# Patient Record
Sex: MALE | Race: WHITE | NOT HISPANIC OR LATINO | URBAN - METROPOLITAN AREA
[De-identification: names, ages, dates, MRNs, and addresses within clinical notes are randomized per-mention and may not be internally consistent; named-entity substitution may affect disease eponyms.]

---

## 2020-06-03 ENCOUNTER — OFFICE VISIT (OUTPATIENT)
Dept: URBAN - METROPOLITAN AREA CLINIC 13 | Facility: CLINIC | Age: 35
End: 2020-06-03

## 2020-06-04 ENCOUNTER — OFFICE VISIT (OUTPATIENT)
Dept: URBAN - METROPOLITAN AREA CLINIC 113 | Facility: CLINIC | Age: 35
End: 2020-06-04

## 2020-07-25 ENCOUNTER — TELEPHONE ENCOUNTER (OUTPATIENT)
Dept: URBAN - METROPOLITAN AREA CLINIC 13 | Facility: CLINIC | Age: 35
End: 2020-07-25

## 2020-07-25 RX ORDER — CIPROFLOXACIN HYDROCHLORIDE 500 MG/1
TAKE 1 TABLET EVERY 12 HOURS DAILY TABLET, FILM COATED ORAL
Qty: 28 | Refills: 0 | OUTPATIENT
Start: 2015-05-15 | End: 2016-03-07

## 2020-07-25 RX ORDER — INFLIXIMAB 100 MG/10ML
USE AS DIRECTED INJECTION, POWDER, LYOPHILIZED, FOR SOLUTION INTRAVENOUS
Refills: 0 | OUTPATIENT
End: 2020-06-03

## 2020-07-25 RX ORDER — METRONIDAZOLE 500 MG/1
TAKE 1 TABLET TWICE DAILY TABLET ORAL
Qty: 28 | Refills: 0 | OUTPATIENT
Start: 2015-05-15 | End: 2016-03-07

## 2020-07-25 RX ORDER — METRONIDAZOLE 500 MG/1
TAKE 1 TABLET 3 TIMES DAILY TABLET ORAL
Qty: 90 | Refills: 0 | OUTPATIENT
Start: 2016-11-11 | End: 2016-11-29

## 2020-07-25 RX ORDER — PANTOPRAZOLE SODIUM 40 MG/1
TAKE 1 TABLET DAILY TABLET, DELAYED RELEASE ORAL
Qty: 30 | Refills: 6 | OUTPATIENT
Start: 2016-03-07 | End: 2017-11-22

## 2020-07-25 RX ORDER — OXYCODONE AND ACETAMINOPHEN 5; 325 MG/1; MG/1
TAKE 1 TABLET EVERY 4 HOURS AS NEEDED FOR PAIN TABLET ORAL
Qty: 10 | Refills: 0 | OUTPATIENT
Start: 2016-03-07 | End: 2017-11-22

## 2020-07-25 RX ORDER — ALPRAZOLAM 0.5 MG
TAKE 1 TABLET DAILY TABLET ORAL
Refills: 0 | OUTPATIENT
End: 2015-05-11

## 2020-07-25 RX ORDER — MERCAPTOPURINE 50 MG/1
TAKE 1 TABLET DAILY TABLET ORAL
Qty: 30 | Refills: 2 | OUTPATIENT
Start: 2015-05-11 | End: 2016-03-07

## 2020-07-25 RX ORDER — DIPHENOXYLATE HYDROCHLORIDE AND ATROPINE SULFATE 2.5; .025 MG/1; MG/1
TAKE 1 CAPLET EVERY 4-6 HOURS DAILY AS NEEDED TABLET ORAL
Qty: 60 | Refills: 3 | OUTPATIENT
Start: 2016-03-07 | End: 2017-11-22

## 2020-07-25 RX ORDER — PREDNISONE 20 MG/1
TAKE AS DIRECTED TABLET ORAL
Refills: 0 | OUTPATIENT
End: 2015-05-11

## 2020-07-25 RX ORDER — CIPROFLOXACIN HYDROCHLORIDE 500 MG/1
TAKE 1 TABLET EVERY 12 HOURS DAILY TABLET, FILM COATED ORAL
Qty: 60 | Refills: 0 | OUTPATIENT
Start: 2016-11-11 | End: 2016-11-29

## 2020-07-25 RX ORDER — TRAMADOL HYDROCHLORIDE 100 MG/1
TAKE 1 TABLET DAILY TABLET, EXTENDED RELEASE ORAL
Qty: 25 | Refills: 0 | OUTPATIENT
End: 2017-11-22

## 2020-07-26 ENCOUNTER — TELEPHONE ENCOUNTER (OUTPATIENT)
Dept: URBAN - METROPOLITAN AREA CLINIC 13 | Facility: CLINIC | Age: 35
End: 2020-07-26

## 2020-07-26 RX ORDER — TRAMADOL HYDROCHLORIDE 50 MG/1
TABLET ORAL
Qty: 60 | Refills: 0 | Status: ACTIVE | COMMUNITY
Start: 2014-10-28

## 2020-07-26 RX ORDER — FAMCICLOVIR 500 MG/1
TABLET, FILM COATED ORAL
Qty: 7 | Refills: 0 | Status: ACTIVE | COMMUNITY
Start: 2014-07-02

## 2020-07-26 RX ORDER — PROMETHAZINE HYDROCHLORIDE 25 MG/1
TABLET ORAL
Qty: 30 | Refills: 0 | Status: ACTIVE | COMMUNITY
Start: 2014-08-04

## 2020-07-26 RX ORDER — TRAMADOL HYDROCHLORIDE 50 MG/1
TAKE 1 TABLET EVERY 6 HOURS AS NEEDED FOR BREAKTHROUGH PAIN TABLET ORAL
Qty: 30 | Refills: 0 | Status: ACTIVE | COMMUNITY
Start: 2020-06-04

## 2020-07-26 RX ORDER — PROMETHAZINE HYDROCHLORIDE 25 MG/1
SUPPOSITORY RECTAL
Qty: 10 | Refills: 0 | Status: ACTIVE | COMMUNITY
Start: 2014-08-01

## 2020-07-26 RX ORDER — PREDNISONE 10 MG/1
TAKE 3 TABLET DAILY TABLET ORAL
Qty: 90 | Refills: 1 | Status: ACTIVE | COMMUNITY
Start: 2020-06-04

## 2020-07-26 RX ORDER — ALPRAZOLAM 0.25 MG/1
TAKE 1 TABLET BY MOUTH TWICE A DAY AS NEEDED FOR ANXIETY TABLET ORAL
Qty: 30 | Refills: 0 | Status: ACTIVE | COMMUNITY
Start: 2019-09-20

## 2021-04-20 ENCOUNTER — TELEPHONE ENCOUNTER (OUTPATIENT)
Dept: URBAN - METROPOLITAN AREA SURGERY CENTER 30 | Facility: SURGERY CENTER | Age: 36
End: 2021-04-20

## 2021-05-21 ENCOUNTER — ERX REFILL RESPONSE (OUTPATIENT)
Dept: URBAN - METROPOLITAN AREA CLINIC 113 | Facility: CLINIC | Age: 36
End: 2021-05-21

## 2021-05-21 RX ORDER — PREDNISONE 10 MG/1
TAKE 3 TABLETS BY MOUTH EVERY DAY TABLET ORAL
Qty: 90 | Refills: 1

## 2021-06-10 ENCOUNTER — TELEPHONE ENCOUNTER (OUTPATIENT)
Dept: URBAN - METROPOLITAN AREA CLINIC 107 | Facility: CLINIC | Age: 36
End: 2021-06-10

## 2021-06-10 ENCOUNTER — OFFICE VISIT (OUTPATIENT)
Dept: URBAN - METROPOLITAN AREA TELEHEALTH 2 | Facility: TELEHEALTH | Age: 36
End: 2021-06-10
Payer: COMMERCIAL

## 2021-06-10 VITALS — WEIGHT: 181 LBS | BODY MASS INDEX: 26.81 KG/M2 | RESPIRATION RATE: 18 BRPM | HEIGHT: 69 IN

## 2021-06-10 DIAGNOSIS — K50.80 CROHN'S COLITIS: ICD-10-CM

## 2021-06-10 PROCEDURE — 99214 OFFICE O/P EST MOD 30 MIN: CPT | Performed by: INTERNAL MEDICINE

## 2021-06-10 RX ORDER — INFLIXIMAB 100 MG/10ML
AS DIRECTED INJECTION, POWDER, LYOPHILIZED, FOR SOLUTION INTRAVENOUS
OUTPATIENT
Start: 2021-06-10

## 2021-06-10 RX ORDER — PREDNISONE 10 MG/1
TAKE 3 TABLETS BY MOUTH EVERY DAY TABLET ORAL
Qty: 90 | Refills: 1 | Status: ACTIVE | COMMUNITY

## 2021-06-10 RX ORDER — TRAMADOL HYDROCHLORIDE 50 MG/1
TABLET ORAL
Qty: 60 | Refills: 0 | Status: DISCONTINUED | COMMUNITY
Start: 2014-10-28

## 2021-06-10 RX ORDER — FAMCICLOVIR 500 MG/1
TABLET, FILM COATED ORAL
Qty: 7 | Refills: 0 | Status: DISCONTINUED | COMMUNITY
Start: 2014-07-02

## 2021-06-10 RX ORDER — ALPRAZOLAM 0.25 MG/1
TAKE 1 TABLET BY MOUTH TWICE A DAY AS NEEDED FOR ANXIETY TABLET ORAL
Qty: 30 | Refills: 0 | Status: ACTIVE | COMMUNITY
Start: 2019-09-20

## 2021-06-10 RX ORDER — PROMETHAZINE HYDROCHLORIDE 25 MG/1
TABLET ORAL
Qty: 30 | Refills: 0 | Status: DISCONTINUED | COMMUNITY
Start: 2014-08-04

## 2021-06-10 RX ORDER — PROMETHAZINE HYDROCHLORIDE 25 MG/1
SUPPOSITORY RECTAL
Qty: 10 | Refills: 0 | Status: DISCONTINUED | COMMUNITY
Start: 2014-08-01

## 2021-06-10 NOTE — HPI-TODAY'S VISIT:
Mr. Ventura is a 35-year-old male with fistulizing ileocolonic Crohn's disease diagnosed at age 5 s/p 2 small bowel/colonic resections previously on Remicade 5mg/kg q 6 weeks presenting for follow up. He was last seen here on June 2020 via telemedicine visit.  At that time, he had moved to [facility] in October 2019 and had had no Remicade since that time.  He had some obstructive symptoms around the time of that June 2020 telemedicine visit, and self-medicating with prednisone and a liquid diet.  His symptoms improved after this.  He got back on Remicade until November 2020, when he lost his insurance once again, and he has not had any Remicade since then.  Despite this, he has done relatively well, with only one flareup in February 2021, which responded to prednisone.  He is now moved back to [place], is working for [employer] once again, and will need to be placed back on Remicade therapy.  He has previously received infusions through [facility] , like to go back to them for this as they will do his infusions at home.  Prior to his June 2020 telemedicine visit,  he had not been seen since November 22, 2017. He had a previous problem with a recurrent fistula, and had been referred to Dr. Jamey Mcginnis of colorectal surgery. Dr. Mcginnis performed surgery on the patient in January 2017. The patient returned at the time the last visit and stated that he was doing "great." He had continued on Remicade, with no problems with fistulas since his surgery, and was not having significant diarrhea.   At the time I initially saw him, on April 9, 2015, we recommended a surveillance colonoscopy, as his last colonoscopy had been in 2013 by Dr. Aguilar. The patient never had this procedure done. He did have a CT enterography performed on April 24, 2015 which showed a matted aggregation of bowel loops, both large and small bowel, in the right lower quadrant area. There was no abscess or perforation.   He has had no recent labs done.

## 2021-06-17 ENCOUNTER — TELEPHONE ENCOUNTER (OUTPATIENT)
Dept: URBAN - METROPOLITAN AREA CLINIC 113 | Facility: CLINIC | Age: 36
End: 2021-06-17

## 2021-06-29 ENCOUNTER — TELEPHONE ENCOUNTER (OUTPATIENT)
Dept: URBAN - METROPOLITAN AREA CLINIC 113 | Facility: CLINIC | Age: 36
End: 2021-06-29

## 2021-07-09 ENCOUNTER — TELEPHONE ENCOUNTER (OUTPATIENT)
Dept: URBAN - METROPOLITAN AREA CLINIC 113 | Facility: CLINIC | Age: 36
End: 2021-07-09

## 2021-09-22 ENCOUNTER — TELEPHONE ENCOUNTER (OUTPATIENT)
Dept: URBAN - METROPOLITAN AREA CLINIC 113 | Facility: CLINIC | Age: 36
End: 2021-09-22

## 2021-09-22 ENCOUNTER — OFFICE VISIT (OUTPATIENT)
Dept: URBAN - METROPOLITAN AREA CLINIC 107 | Facility: CLINIC | Age: 36
End: 2021-09-22

## 2021-09-22 RX ORDER — INFLIXIMAB 100 MG/10ML
AS DIRECTED INJECTION, POWDER, LYOPHILIZED, FOR SOLUTION INTRAVENOUS
Status: ACTIVE | COMMUNITY
Start: 2021-06-10

## 2021-09-22 RX ORDER — ALPRAZOLAM 0.25 MG/1
TAKE 1 TABLET BY MOUTH TWICE A DAY AS NEEDED FOR ANXIETY TABLET ORAL
Qty: 30 | Refills: 0 | Status: ACTIVE | COMMUNITY
Start: 2019-09-20

## 2021-09-22 RX ORDER — PREDNISONE 10 MG/1
TAKE 3 TABLETS BY MOUTH EVERY DAY TABLET ORAL
Qty: 90 | Refills: 1 | Status: ACTIVE | COMMUNITY

## 2021-09-22 NOTE — HPI-TODAY'S VISIT:
Mr. Ventura is a 36-year-old male with fistulizing ileocolonic Crohn's disease diagnosed at age 5 s/p 2 small bowel/colonic resections previously on Remicade 5mg/kg q 6 weeks presenting for follow up. He was last seen here on Bing 10, 2021.  He was previously seen here on June 2020 via telemedicine visit.  At that time, he had moved to [facility] in October 2019 and had had no Remicade since that time.  He had some obstructive symptoms around the time of that June 2020 telemedicine visit, and self-medicating with prednisone and a liquid diet.  His symptoms improved after this.  He got back on Remicade until November 2020, when he lost his insurance once again, and he has not had any Remicade since then.  Despite this, he has done relatively well, with only one flareup in February 2021, which responded to prednisone.  He has now moved back to [place], is working for [employer] once again, and will need to be placed back on Remicade therapy.  He has previously received infusions through [facility] , like to go back to them for this as they will do his infusions at home.  This is supposed to be arranged at the time of his last visit, and orders were written, but his insurance denied the claim.  Prior to his June 2020 telemedicine visit,  he had not been seen since November 22, 2017. He had a previous problem with a recurrent fistula, and had been referred to Dr. Jamey Mcginnis of colorectal surgery. Dr. Mcginnis performed surgery on the patient in January 2017. The patient returned at the time the last visit and stated that he was doing "great." He had continued on Remicade, with no problems with fistulas since his surgery, and was not having significant diarrhea.   At the time I initially saw him, on April 9, 2015, we recommended a surveillance colonoscopy, as his last colonoscopy had been in 2013 by Dr. Aguilar. The patient never had this procedure done. He did have a CT enterography performed on April 24, 2015 which showed a matted aggregation of bowel loops, both large and small bowel, in the right lower quadrant area. There was no abscess or perforation.   He has had no recent labs done.

## 2022-01-07 ENCOUNTER — OFFICE VISIT (OUTPATIENT)
Dept: URBAN - METROPOLITAN AREA CLINIC 107 | Facility: CLINIC | Age: 37
End: 2022-01-07

## 2022-01-07 RX ORDER — ALPRAZOLAM 0.25 MG/1
TAKE 1 TABLET BY MOUTH TWICE A DAY AS NEEDED FOR ANXIETY TABLET ORAL
Qty: 30 | Refills: 0 | Status: ACTIVE | COMMUNITY
Start: 2019-09-20

## 2022-01-07 RX ORDER — PREDNISONE 10 MG/1
TAKE 3 TABLETS BY MOUTH EVERY DAY TABLET ORAL
Qty: 90 | Refills: 1 | Status: ACTIVE | COMMUNITY

## 2022-01-07 RX ORDER — INFLIXIMAB 100 MG/10ML
AS DIRECTED INJECTION, POWDER, LYOPHILIZED, FOR SOLUTION INTRAVENOUS
Status: ACTIVE | COMMUNITY
Start: 2021-06-10

## 2022-01-07 NOTE — HPI-TODAY'S VISIT:
Mr. Ventura is a 36-year-old male with fistulizing ileocolonic Crohn's disease diagnosed at age 5 s/p 2 small bowel/colonic resections previously on Remicade 5mg/kg q 6 weeks presenting for follow up. He was last seen here on Bing 10, 2021.  He was previously seen here on June 2020 via telemedicine visit.  At that time, he had moved to [facility] in October 2019 and had had no Remicade since that time.  He had some obstructive symptoms around the time of that June 2020 telemedicine visit, and self-medicating with prednisone and a liquid diet.  His symptoms improved after this.  He got back on Remicade until November 2020, when he lost his insurance once again, and he has not had any Remicade since then.  Despite this, he has done relatively well, with only one flareup in February 2021, which responded to prednisone.  He has now moved back to [place], is working for [employer] once again, and will need to be placed back on Remicade therapy.  He has previously received infusions through [facility] , like to go back to them for this as they will do his infusions at home.  This is supposed to be arranged at the time of his last visit, and orders were written, but his insurance denied the claim.  Prior to his June 2020 telemedicine visit,  he had not been seen since November 22, 2017. He had a previous problem with a recurrent fistula, and had been referred to Dr. Jamey Mcginnis of colorectal surgery. Dr. Mcginnis performed surgery on the patient in January 2017. The patient returned at the time the last visit and stated that he was doing "great." He had continued on Remicade, with no problems with fistulas since his surgery, and was not having significant diarrhea.   At the time I initially saw him, on April 9, 2015, we recommended a surveillance colonoscopy, as his last colonoscopy had been in 2013 by Dr. Aguilar. The patient never had this procedure done. He did have a CT enterography performed on April 24, 2015 which showed a matted aggregation of bowel loops, both large and small bowel, in the right lower quadrant area. There was no abscess or perforation.   He has had no recent labs done.

## 2022-06-27 ENCOUNTER — TELEPHONE ENCOUNTER (OUTPATIENT)
Dept: URBAN - METROPOLITAN AREA CLINIC 113 | Facility: CLINIC | Age: 37
End: 2022-06-27

## 2022-06-28 ENCOUNTER — TELEPHONE ENCOUNTER (OUTPATIENT)
Dept: URBAN - METROPOLITAN AREA CLINIC 113 | Facility: CLINIC | Age: 37
End: 2022-06-28

## 2022-08-02 ENCOUNTER — OFFICE VISIT (OUTPATIENT)
Dept: URBAN - METROPOLITAN AREA CLINIC 107 | Facility: CLINIC | Age: 37
End: 2022-08-02
Payer: COMMERCIAL

## 2022-08-02 ENCOUNTER — TELEPHONE ENCOUNTER (OUTPATIENT)
Dept: URBAN - METROPOLITAN AREA CLINIC 113 | Facility: CLINIC | Age: 37
End: 2022-08-02

## 2022-08-02 ENCOUNTER — WEB ENCOUNTER (OUTPATIENT)
Dept: URBAN - METROPOLITAN AREA CLINIC 107 | Facility: CLINIC | Age: 37
End: 2022-08-02

## 2022-08-02 VITALS
DIASTOLIC BLOOD PRESSURE: 94 MMHG | WEIGHT: 190 LBS | HEART RATE: 83 BPM | HEIGHT: 69 IN | RESPIRATION RATE: 18 BRPM | BODY MASS INDEX: 28.14 KG/M2 | SYSTOLIC BLOOD PRESSURE: 128 MMHG | TEMPERATURE: 98.6 F

## 2022-08-02 DIAGNOSIS — K50.80 CROHN'S DISEASE OF BOTH SMALL AND LARGE INTESTINE WITHOUT COMPLICATION: ICD-10-CM

## 2022-08-02 PROBLEM — 426549001: Status: ACTIVE | Noted: 2021-06-10

## 2022-08-02 PROCEDURE — 99213 OFFICE O/P EST LOW 20 MIN: CPT | Performed by: INTERNAL MEDICINE

## 2022-08-02 RX ORDER — SODIUM, POTASSIUM,MAG SULFATES 17.5-3.13G
354 ML SOLUTION, RECONSTITUTED, ORAL ORAL ONCE
Qty: 354 MILLILITER | Refills: 0 | OUTPATIENT
Start: 2022-08-04 | End: 2022-08-05

## 2022-08-02 RX ORDER — INFLIXIMAB 100 MG/10ML
5MG/KG INJECTION, POWDER, LYOPHILIZED, FOR SOLUTION INTRAVENOUS
Status: ACTIVE | COMMUNITY
Start: 2021-06-10

## 2022-08-02 RX ORDER — PREDNISONE 10 MG/1
TAKE 3 TABLETS BY MOUTH EVERY DAY TABLET ORAL
Qty: 90 | Refills: 1 | Status: ON HOLD | COMMUNITY

## 2022-08-02 RX ORDER — ALPRAZOLAM 0.25 MG/1
TAKE 1 TABLET BY MOUTH TWICE A DAY AS NEEDED FOR ANXIETY TABLET ORAL
Qty: 30 | Refills: 0 | Status: ON HOLD | COMMUNITY
Start: 2019-09-20

## 2022-08-02 NOTE — HPI-TODAY'S VISIT:
Is a 36-year-old male with a history of fistulizing ileocolonic Crohn's disease diagnosed at age 5 status post 2 small bowel/colonic resections on Remicade presenting for follow-up. He was last seen 6/10/2021 for a telemedicine visit.  He was in clinical remission but has been off Remicade.  He was to undergo reinduction and labs were ordered. He is compliant with Remicade 5 mg/kg every 6 weeks.  His bowel habits remain at baseline.  He reports, since an ileocecectomy, he has experienced 8 bowel movements per day in addition to nocturnal stools.  He has postprandial bloating and feels unwell after a meal.  This improves after a bowel movement.  He denies abdominal pain, nausea, red blood per rectum, or any other abdominal symptoms.  He had labs with Dr. Dimas 2 months ago.  Labs ordered after his last visit were performed at [facility].

## 2022-08-04 ENCOUNTER — LAB OUTSIDE AN ENCOUNTER (OUTPATIENT)
Dept: URBAN - METROPOLITAN AREA CLINIC 113 | Facility: CLINIC | Age: 37
End: 2022-08-04

## 2022-08-04 PROBLEM — 71833008: Status: ACTIVE | Noted: 2022-08-02

## 2022-08-08 ENCOUNTER — TELEPHONE ENCOUNTER (OUTPATIENT)
Dept: URBAN - METROPOLITAN AREA CLINIC 113 | Facility: CLINIC | Age: 37
End: 2022-08-08

## 2022-08-09 ENCOUNTER — TELEPHONE ENCOUNTER (OUTPATIENT)
Dept: URBAN - METROPOLITAN AREA CLINIC 113 | Facility: CLINIC | Age: 37
End: 2022-08-09

## 2023-04-07 ENCOUNTER — TELEPHONE ENCOUNTER (OUTPATIENT)
Dept: URBAN - METROPOLITAN AREA CLINIC 113 | Facility: CLINIC | Age: 38
End: 2023-04-07

## 2023-04-07 ENCOUNTER — TELEPHONE ENCOUNTER (OUTPATIENT)
Dept: URBAN - METROPOLITAN AREA CLINIC 107 | Facility: CLINIC | Age: 38
End: 2023-04-07

## 2023-04-07 RX ORDER — PREDNISONE 10 MG/1
TAKE 3 TABLETS BY MOUTH EVERY DAY TABLET ORAL
Qty: 90 | Refills: 0

## 2023-04-07 RX ORDER — PREDNISONE 10 MG/1
1 TABLET TABLET ORAL ONCE A DAY
Qty: 90 | Refills: 0 | OUTPATIENT
Start: 2023-04-07 | End: 2023-05-07

## 2023-04-20 ENCOUNTER — WEB ENCOUNTER (OUTPATIENT)
Dept: URBAN - METROPOLITAN AREA CLINIC 113 | Facility: CLINIC | Age: 38
End: 2023-04-20

## 2023-06-13 ENCOUNTER — OFFICE VISIT (OUTPATIENT)
Dept: URBAN - METROPOLITAN AREA CLINIC 107 | Facility: CLINIC | Age: 38
End: 2023-06-13
Payer: COMMERCIAL

## 2023-06-13 ENCOUNTER — LAB OUTSIDE AN ENCOUNTER (OUTPATIENT)
Dept: URBAN - METROPOLITAN AREA CLINIC 107 | Facility: CLINIC | Age: 38
End: 2023-06-13

## 2023-06-13 ENCOUNTER — WEB ENCOUNTER (OUTPATIENT)
Dept: URBAN - METROPOLITAN AREA CLINIC 107 | Facility: CLINIC | Age: 38
End: 2023-06-13

## 2023-06-13 VITALS
WEIGHT: 198 LBS | HEIGHT: 69 IN | RESPIRATION RATE: 18 BRPM | SYSTOLIC BLOOD PRESSURE: 123 MMHG | BODY MASS INDEX: 29.33 KG/M2 | HEART RATE: 87 BPM | TEMPERATURE: 96.8 F | DIASTOLIC BLOOD PRESSURE: 80 MMHG

## 2023-06-13 DIAGNOSIS — Z79.899 HIGH RISK MEDICATION USE: ICD-10-CM

## 2023-06-13 DIAGNOSIS — K50.80 CROHN'S DISEASE OF BOTH SMALL AND LARGE INTESTINE WITHOUT COMPLICATION: ICD-10-CM

## 2023-06-13 PROBLEM — 453861000124107: Status: ACTIVE | Noted: 2023-06-13

## 2023-06-13 PROCEDURE — 99214 OFFICE O/P EST MOD 30 MIN: CPT | Performed by: NURSE PRACTITIONER

## 2023-06-13 RX ORDER — INFLIXIMAB 100 MG/10ML
5MG/KG INJECTION, POWDER, LYOPHILIZED, FOR SOLUTION INTRAVENOUS
Status: ACTIVE | COMMUNITY
Start: 2021-06-10

## 2023-06-13 RX ORDER — PREDNISONE 10 MG/1
TAKE 3 TABLETS BY MOUTH EVERY DAY TABLET ORAL
Qty: 90 | Refills: 0 | Status: ON HOLD | COMMUNITY

## 2023-06-13 RX ORDER — ALPRAZOLAM 0.25 MG/1
TAKE 1 TABLET BY MOUTH TWICE A DAY AS NEEDED FOR ANXIETY TABLET ORAL
Qty: 30 | Refills: 0 | Status: ON HOLD | COMMUNITY
Start: 2019-09-20

## 2023-06-13 NOTE — HPI-OTHER HISTORIES
Labs 8/26/2022: CBC: WBC 8.8, hemoglobin 14, MCV 85, platelet 398.  Cholesterol panel normal with exception of triglycerides 470, HDL 35, VLDL 73.  BMP normal with exception of potassium 3.3.  LFTs: TB 0.3, ALP 71, ALT 25, AST 27.  Vitamin D 25-hydroxy 44.

## 2023-06-13 NOTE — HPI-TODAY'S VISIT:
This is a 37-year-old male with a history of fistulizing ileocolonic Crohn's disease diagnosed at age 5 status post 2 small bowel/colonic resections on Remicade presenting for follow-up. He was last seen 8/2/2022 for follow-up regarding Crohn's disease.  His bowel habits were at baseline on Remicade 5 mg/kg every 6 weeks.  Baseline bowel habits included frequent stools as well as nocturnal stools.  He had declined intervention for frequent bowel movements in the past and reported feeling unwell when his bowels were not moving.  Recent labs were requested from his primary care physician.  QuantiFERON gold TB was ordered.  Recommendations regarding colonoscopy and CT enterography were discussed with Dr. Coon.  Dr. Coon recommended a surveillance colonoscopy.  This was discussed with the patient who was not ready to schedule.  He called our office in early April requesting a refill on prednisone.  He reported restrictive bowel movements that were slow compared with normal.  He reported a prior history of blockages.  He was having some abdominal cramping and bloating for which he had started a clear liquid diet the day prior to the phone call.  He reported that he was low on prednisone and that he needed a supply to take when he experienced a flareup.  He was prescribed prednisone 10 mg 3 tablets daily.  Colonoscopy was not scheduled. He is doing well.  He reports that he has "way less problems" over the last 2 years since he has been working from home.  He has frequent bowel movements per his normal bowel pattern. When he called our office in April with symptoms, he also had drainage and discomfort from prior fistula.  He reports blood in the drainage.  All of his symptoms resolved on prednisone.  He reports that he last required prednisone for symptoms 2 years ago.  This occurs rarely.  He has postprandial bloating that is relieved with a bowel movement.  He denies any other abdominal symptoms.  He is compliant with Remicade 5 mg/kg every 6 weeks.

## 2023-09-22 ENCOUNTER — OUT OF OFFICE VISIT (OUTPATIENT)
Dept: URBAN - METROPOLITAN AREA SURGERY CENTER 25 | Facility: SURGERY CENTER | Age: 38
End: 2023-09-22
Payer: COMMERCIAL

## 2023-09-22 ENCOUNTER — CLAIMS CREATED FROM THE CLAIM WINDOW (OUTPATIENT)
Dept: URBAN - METROPOLITAN AREA CLINIC 4 | Facility: CLINIC | Age: 38
End: 2023-09-22
Payer: COMMERCIAL

## 2023-09-22 ENCOUNTER — CLAIMS CREATED FROM THE CLAIM WINDOW (OUTPATIENT)
Dept: URBAN - METROPOLITAN AREA SURGERY CENTER 25 | Facility: SURGERY CENTER | Age: 38
End: 2023-09-22

## 2023-09-22 DIAGNOSIS — K50.80 CROHN'S DISEASE OF BOTH SMALL AND LARGE INTESTINE WITHOUT COMPLICATIONS: ICD-10-CM

## 2023-09-22 DIAGNOSIS — K64.0 FIRST DEGREE HEMORRHOIDS: ICD-10-CM

## 2023-09-22 DIAGNOSIS — K63.89 OTHER SPECIFIED DISEASES OF INTESTINE: ICD-10-CM

## 2023-09-22 DIAGNOSIS — Z12.11 COLON CANCER SCREENING (HIGH RISK): ICD-10-CM

## 2023-09-22 DIAGNOSIS — K63.89 APPENDICITIS EPIPLOICA: ICD-10-CM

## 2023-09-22 DIAGNOSIS — K50.80 CROHN'S COLITIS: ICD-10-CM

## 2023-09-22 DIAGNOSIS — Z98.0 INTESTINAL BYPASS AND ANASTOMOSIS STATUS: ICD-10-CM

## 2023-09-22 PROCEDURE — 88305 TISSUE EXAM BY PATHOLOGIST: CPT | Performed by: PATHOLOGY

## 2023-09-22 PROCEDURE — 45380 COLONOSCOPY AND BIOPSY: CPT | Performed by: INTERNAL MEDICINE

## 2023-09-22 PROCEDURE — 00811 ANES LWR INTST NDSC NOS: CPT | Performed by: ANESTHESIOLOGY

## 2023-09-22 PROCEDURE — G8907 PT DOC NO EVENTS ON DISCHARG: HCPCS | Performed by: INTERNAL MEDICINE

## 2023-09-22 RX ORDER — PREDNISONE 10 MG/1
TAKE 3 TABLETS BY MOUTH EVERY DAY TABLET ORAL
Qty: 90 | Refills: 0 | Status: ON HOLD | COMMUNITY

## 2023-09-22 RX ORDER — INFLIXIMAB 100 MG/10ML
5MG/KG INJECTION, POWDER, LYOPHILIZED, FOR SOLUTION INTRAVENOUS
Status: ACTIVE | COMMUNITY
Start: 2021-06-10

## 2023-09-22 RX ORDER — ALPRAZOLAM 0.25 MG/1
TAKE 1 TABLET BY MOUTH TWICE A DAY AS NEEDED FOR ANXIETY TABLET ORAL
Qty: 30 | Refills: 0 | Status: ON HOLD | COMMUNITY
Start: 2019-09-20

## 2023-12-20 ENCOUNTER — TELEPHONE ENCOUNTER (OUTPATIENT)
Dept: URBAN - METROPOLITAN AREA CLINIC 113 | Facility: CLINIC | Age: 38
End: 2023-12-20

## 2023-12-21 ENCOUNTER — TELEPHONE ENCOUNTER (OUTPATIENT)
Dept: URBAN - METROPOLITAN AREA CLINIC 113 | Facility: CLINIC | Age: 38
End: 2023-12-21

## 2024-02-08 ENCOUNTER — OV EP (OUTPATIENT)
Dept: URBAN - METROPOLITAN AREA CLINIC 113 | Facility: CLINIC | Age: 39
End: 2024-02-08
Payer: COMMERCIAL

## 2024-02-08 VITALS
HEART RATE: 75 BPM | TEMPERATURE: 97.6 F | HEIGHT: 69 IN | DIASTOLIC BLOOD PRESSURE: 88 MMHG | BODY MASS INDEX: 28.73 KG/M2 | WEIGHT: 194 LBS | RESPIRATION RATE: 20 BRPM | SYSTOLIC BLOOD PRESSURE: 123 MMHG

## 2024-02-08 DIAGNOSIS — K62.5 BRIGHT RED BLOOD PER RECTUM: ICD-10-CM

## 2024-02-08 DIAGNOSIS — K62.89 PROCTALGIA: ICD-10-CM

## 2024-02-08 DIAGNOSIS — K50.80 CROHN'S DISEASE OF BOTH SMALL AND LARGE INTESTINE WITHOUT COMPLICATION: ICD-10-CM

## 2024-02-08 DIAGNOSIS — K60.2 ANAL FISSURE: ICD-10-CM

## 2024-02-08 PROBLEM — 74474003: Status: ACTIVE | Noted: 2024-02-08

## 2024-02-08 PROBLEM — 77880009: Status: ACTIVE | Noted: 2024-02-08

## 2024-02-08 PROBLEM — 30037006: Status: ACTIVE | Noted: 2024-02-08

## 2024-02-08 PROCEDURE — 99214 OFFICE O/P EST MOD 30 MIN: CPT | Performed by: NURSE PRACTITIONER

## 2024-02-08 RX ORDER — PREDNISONE 10 MG/1
TAKE 3 TABLETS BY MOUTH EVERY DAY TABLET ORAL
Qty: 90 | Refills: 0 | Status: ON HOLD | COMMUNITY

## 2024-02-08 RX ORDER — CIPROFLOXACIN HYDROCHLORIDE 500 MG/1
1 TABLET TABLET, FILM COATED ORAL
Qty: 20 TABLET | Refills: 0 | OUTPATIENT
Start: 2024-02-08 | End: 2024-02-18

## 2024-02-08 RX ORDER — ALPRAZOLAM 0.25 MG/1
TAKE 1 TABLET BY MOUTH TWICE A DAY AS NEEDED FOR ANXIETY TABLET ORAL
Qty: 30 | Refills: 0 | Status: ACTIVE | COMMUNITY
Start: 2019-09-20

## 2024-02-08 RX ORDER — INFLIXIMAB 100 MG/10ML
5MG/KG INJECTION, POWDER, LYOPHILIZED, FOR SOLUTION INTRAVENOUS
Status: ACTIVE | COMMUNITY
Start: 2021-06-10

## 2024-02-08 NOTE — PHYSICAL EXAM RECTAL:
normal tone, no external hemorrhoids, no perianal tenderness or induration, tender at 12:00 at anal verge with palpable fissure (ELIGIO not completed due to tenderness)

## 2024-02-08 NOTE — HPI-TODAY'S VISIT:
This is a 37-year-old male with a history of fistulizing ileocolonic Crohn's disease diagnosed at age 5 status post 2 small bowel/colonic resections on Remicade 5 mg/kg every 6 weeks presenting for follow-up after surveillance colonoscopy.He was last seen in the office 6/13/2023 for follow-up regarding Crohn's disease.  He had a flare of symptoms in April that had resolved with prednisone.  He was currently doing well on Remicade.  He was to have labs drawn with his primary care physician including QuantiFERON gold TB and inflammatory markers.  He was scheduled for a surveillance colonoscopy.  Colonoscopy 9/22/2023: BBPS 9 (MiraLAX), mild grade 1 nonbleeding internal hemorrhoids.  Inflammation found in the sigmoid, descending, and transverse colon graded as mets grade 2 (mild granularity of the mucosa with mild contact bleeding) improved compared to previous examination status post biopsy and patent end-to-side ileocolonic anastomosis characterized by healthy-appearing mucosa.  Pathology: Random biopsies demonstrated no significant abnormality.  He is experiencing symptoms possibly associated with hemorrhoids.  With first bowel movements in the morning, he feels as though there is tissue "pushing out and talking back.".  After he completes his bowel movements, there is some pain.  This worsens throughout the day.  In the early afternoon, he has large-volume red blood per rectum on the tissue or seeping in his underwear so that he has had to wear a pad.  He has also noted occasional pink mucous similar to when he is experienced fistulous without swelling.  He reports tenderness to touch and constant pressure and discomfort by bedtime.  He has tried Tucks cream without improvement.  He reports symptoms began in September prior to his colonoscopy and have worsened since December His bowel habits are at baseline.  On a good day, he has 8 loose or watery stools and on a bad day he has 12.  He typically has 1 stool at night.  He has occasional abdominal pain associated with bloating and gas.  If this does not resolve, he becomes concerned regarding an obstruction.  He will observe a soft diet and take a short course of prednisone.  Last week, he took prednisone for 2 days for similar symptoms. He is requesting a letter for work.  He has been working "a hybrid schedule" working 1 day in person and 4 at home.  His department is undergoing changes and are planning on in person only working.  Due to frequency of bowel movements and occasional urgency, he has been less encumbered by working at home and has maintained 100% productivity. In addition, he commutes to work which causes stress if he experiences urgency whil driving. His supervisor is agreeable to regarding continuing to schedule but requires a note from his physician.

## 2024-02-08 NOTE — HPI-OTHER HISTORIES
Colonoscopy 9/22/2023: BBPS 9 (MiraLAX), mild grade 1 nonbleeding internal hemorrhoids. Inflammation found in the sigmoid, descending, and transverse colon graded as mets grade 2 (mild granularity of the mucosa with mild contact bleeding) improved compared to previous examination status post biopsy and patent end-to-side ileocolonic anastomosis characterized by healthy-appearing mucosa. Pathology: Random biopsies demonstrated no significant abnormality.

## 2024-04-09 ENCOUNTER — OV EP (OUTPATIENT)
Dept: URBAN - METROPOLITAN AREA CLINIC 107 | Facility: CLINIC | Age: 39
End: 2024-04-09
Payer: COMMERCIAL

## 2024-04-09 VITALS
WEIGHT: 196 LBS | TEMPERATURE: 98.2 F | HEIGHT: 69 IN | SYSTOLIC BLOOD PRESSURE: 117 MMHG | DIASTOLIC BLOOD PRESSURE: 95 MMHG | HEART RATE: 84 BPM | BODY MASS INDEX: 29.03 KG/M2

## 2024-04-09 DIAGNOSIS — K62.5 BRIGHT RED BLOOD PER RECTUM: ICD-10-CM

## 2024-04-09 DIAGNOSIS — K60.2 ANAL FISSURE: ICD-10-CM

## 2024-04-09 DIAGNOSIS — K62.89 PROCTALGIA: ICD-10-CM

## 2024-04-09 DIAGNOSIS — K50.80 CROHN'S DISEASE OF BOTH SMALL AND LARGE INTESTINE WITHOUT COMPLICATION: ICD-10-CM

## 2024-04-09 PROCEDURE — 99214 OFFICE O/P EST MOD 30 MIN: CPT | Performed by: NURSE PRACTITIONER

## 2024-04-09 RX ORDER — PREDNISONE 10 MG/1
TAKE 3 TABLETS BY MOUTH EVERY DAY TABLET ORAL
Qty: 90 | Refills: 0 | Status: ON HOLD | COMMUNITY

## 2024-04-09 RX ORDER — INFLIXIMAB 100 MG/10ML
5MG/KG INJECTION, POWDER, LYOPHILIZED, FOR SOLUTION INTRAVENOUS
Status: ACTIVE | COMMUNITY
Start: 2021-06-10

## 2024-04-09 RX ORDER — ALPRAZOLAM 0.25 MG/1
TAKE 1 TABLET BY MOUTH TWICE A DAY AS NEEDED FOR ANXIETY TABLET ORAL
Qty: 30 | Refills: 0 | Status: ACTIVE | COMMUNITY
Start: 2019-09-20

## 2024-04-09 RX ORDER — CIPROFLOXACIN HYDROCHLORIDE 500 MG/1
1 TABLET TABLET, FILM COATED ORAL
Qty: 20 TABLET | Refills: 0 | OUTPATIENT
Start: 2024-04-09 | End: 2024-04-19

## 2024-04-09 NOTE — HPI-TODAY'S VISIT:
This is a 38-year-old male with a history of fistulizing ileocolonic Crohn's disease diagnosed at age 5 status post 2 small bowel/colonic resections on Remicade 5 mg/kg every 6 weeks presenting for follow-up.    He was last seen in the office 2/8/2024.  He had a colonoscopy in September notable for inflammation in the sigmoid, descending, and transverse colon graded as mets grade 2.  This appeared improved from prior examinations and surgical anastomosis was healthy-appearing.  Random biopsies were negative.  He reported proctalgia and red blood per rectum requiring him to wear a pad.  Occasionally, he had drainage of pink mucus similar to prior drainage from a fistula.  No fistulous opening or induration was noted on exam; findings suspicious for anterior anal fissure.  It was recommended that he continue sitz bath's daily, add daily fiber, and begin topical therapy with nifedipine/lidocaine ointment.  Findings were discussed with Dr. Coon who also recommended a 10-day course of Cipro to cover a possible fistula.  Colorectal surgery referral was a consideration for exam under anesthesia if symptoms persisted.  He is compliant with Remicade 5 mg/kg every 6 weeks.  His bowel habits are stable reporting 8-10 bowel movements per day.  Frequent bowel movements usually ease off in the morning and recur in the afternoon.  He reports persistent perianal blood and drainage with pain and discomfort.  He reports a mixture of blood and clear mucus that waxes and wanes.  Nifedipine cream has been somewhat helpful.  He has recently started Preparation H cream and wipes and also reports some improvement using this.  He was not notified of a ready prescription for Cipro at his pharmacy and did not complete course as prescribed at his last visit.

## 2024-04-29 PROBLEM — 43339004: Status: ACTIVE | Noted: 2024-04-29

## 2024-05-16 ENCOUNTER — TELEPHONE ENCOUNTER (OUTPATIENT)
Dept: URBAN - METROPOLITAN AREA CLINIC 113 | Facility: CLINIC | Age: 39
End: 2024-05-16

## 2024-05-28 ENCOUNTER — DASHBOARD ENCOUNTERS (OUTPATIENT)
Age: 39
End: 2024-05-28

## 2024-05-28 ENCOUNTER — OFFICE VISIT (OUTPATIENT)
Dept: URBAN - METROPOLITAN AREA CLINIC 113 | Facility: CLINIC | Age: 39
End: 2024-05-28
Payer: COMMERCIAL

## 2024-05-28 VITALS
SYSTOLIC BLOOD PRESSURE: 134 MMHG | HEIGHT: 69 IN | BODY MASS INDEX: 27.28 KG/M2 | WEIGHT: 184.2 LBS | HEART RATE: 70 BPM | TEMPERATURE: 97.3 F | RESPIRATION RATE: 18 BRPM | DIASTOLIC BLOOD PRESSURE: 99 MMHG

## 2024-05-28 DIAGNOSIS — K62.5 BRIGHT RED BLOOD PER RECTUM: ICD-10-CM

## 2024-05-28 DIAGNOSIS — K60.2 ANAL FISSURE: ICD-10-CM

## 2024-05-28 DIAGNOSIS — K62.89 PROCTALGIA: ICD-10-CM

## 2024-05-28 DIAGNOSIS — K50.80 CROHN'S DISEASE OF BOTH SMALL AND LARGE INTESTINE WITHOUT COMPLICATION: ICD-10-CM

## 2024-05-28 PROCEDURE — 99214 OFFICE O/P EST MOD 30 MIN: CPT | Performed by: INTERNAL MEDICINE

## 2024-05-28 RX ORDER — INFLIXIMAB 100 MG/10ML
5MG/KG INJECTION, POWDER, LYOPHILIZED, FOR SOLUTION INTRAVENOUS
Status: ACTIVE | COMMUNITY
Start: 2021-06-10

## 2024-05-28 RX ORDER — PREDNISONE 10 MG/1
TAKE 3 TABLETS BY MOUTH EVERY DAY TABLET ORAL
Qty: 90 | Refills: 0 | Status: ACTIVE | COMMUNITY

## 2024-05-28 RX ORDER — TAMSULOSIN HYDROCHLORIDE 0.4 MG/1
1 CAPSULE CAPSULE ORAL ONCE A DAY
Status: ACTIVE | COMMUNITY

## 2024-05-28 RX ORDER — ALPRAZOLAM 0.25 MG/1
TAKE 1 TABLET BY MOUTH TWICE A DAY AS NEEDED FOR ANXIETY TABLET ORAL
Qty: 30 | Refills: 0 | Status: ACTIVE | COMMUNITY
Start: 2019-09-20

## 2024-05-28 RX ORDER — OXYCODONE HYDROCHLORIDE 5 MG/1
1 CAPSULE AS NEEDED CAPSULE ORAL
Refills: 0 | Status: ACTIVE | COMMUNITY

## 2024-07-02 ENCOUNTER — TELEPHONE ENCOUNTER (OUTPATIENT)
Dept: URBAN - METROPOLITAN AREA CLINIC 113 | Facility: CLINIC | Age: 39
End: 2024-07-02

## 2024-07-02 ENCOUNTER — OFFICE VISIT (OUTPATIENT)
Dept: URBAN - METROPOLITAN AREA CLINIC 113 | Facility: CLINIC | Age: 39
End: 2024-07-02
Payer: COMMERCIAL

## 2024-07-02 VITALS
HEIGHT: 69 IN | DIASTOLIC BLOOD PRESSURE: 81 MMHG | RESPIRATION RATE: 18 BRPM | HEART RATE: 64 BPM | BODY MASS INDEX: 28.44 KG/M2 | SYSTOLIC BLOOD PRESSURE: 128 MMHG | TEMPERATURE: 100.6 F | WEIGHT: 192 LBS

## 2024-07-02 DIAGNOSIS — K50.80 CROHN'S DISEASE OF BOTH SMALL AND LARGE INTESTINE WITHOUT COMPLICATION: ICD-10-CM

## 2024-07-02 DIAGNOSIS — K62.89 PROCTALGIA: ICD-10-CM

## 2024-07-02 DIAGNOSIS — K62.5 BRIGHT RED BLOOD PER RECTUM: ICD-10-CM

## 2024-07-02 DIAGNOSIS — K60.2 ANAL FISSURE: ICD-10-CM

## 2024-07-02 PROCEDURE — 99214 OFFICE O/P EST MOD 30 MIN: CPT | Performed by: INTERNAL MEDICINE

## 2024-07-02 RX ORDER — ALPRAZOLAM 0.25 MG/1
TAKE 1 TABLET BY MOUTH TWICE A DAY AS NEEDED FOR ANXIETY TABLET ORAL
Qty: 30 | Refills: 0 | Status: ACTIVE | COMMUNITY
Start: 2019-09-20

## 2024-07-02 RX ORDER — TAMSULOSIN HYDROCHLORIDE 0.4 MG/1
1 CAPSULE CAPSULE ORAL ONCE A DAY
Status: ACTIVE | COMMUNITY

## 2024-07-02 RX ORDER — INFLIXIMAB 100 MG/10ML
5MG/KG INJECTION, POWDER, LYOPHILIZED, FOR SOLUTION INTRAVENOUS
Status: ACTIVE | COMMUNITY
Start: 2021-06-10

## 2024-07-02 RX ORDER — OXYCODONE HYDROCHLORIDE 5 MG/1
1 CAPSULE AS NEEDED CAPSULE ORAL
Refills: 0 | Status: ACTIVE | COMMUNITY

## 2024-07-02 RX ORDER — MERCAPTOPURINE 50 MG/1
ONE TABLET TABLET ORAL DAILY
Qty: 30 | Refills: 5 | OUTPATIENT
Start: 2024-07-03

## 2024-07-02 RX ORDER — PREDNISONE 10 MG/1
TAKE 3 TABLETS BY MOUTH EVERY DAY TABLET ORAL
Qty: 90 | Refills: 0 | Status: ACTIVE | COMMUNITY

## 2024-07-02 NOTE — HPI-OTHER HISTORIES
Colonoscopy 9/22/2023: BBPS 9 (MiraLAX), mild grade 1 nonbleeding internal hemorrhoids. Inflammation found in the sigmoid, descending, and transverse colon graded as Matts grade 2 (mild granularity of the mucosa with mild contact bleeding) improved compared to previous examination status post biopsy and patent end-to-side ileocolonic anastomosis characterized by healthy-appearing mucosa. Pathology: Random biopsies demonstrated no significant abnormality.

## 2024-07-02 NOTE — HPI-TODAY'S VISIT:
Howie Ventura is a 38-year-old male with a history of fistulizing ileocolonic Crohn's disease diagnosed at age 5 status post 2 small bowel/colonic resections on Remicade 5 mg/kg every 6 weeks presenting for follow-up. He was last seen here on 5/28/24.  He was previously seen in the office 2/8/2024.  He had a colonoscopy in September notable for inflammation in the sigmoid, descending, and transverse colon graded as Matts grade 2.  This appeared improved from prior examinations and surgical anastomosis was healthy-appearing.  Random biopsies were negative.  He reported proctalgia and red blood per rectum requiring him to wear a pad.  Occasionally, he had drainage of pink mucus similar to prior drainage from a fistula.  No fistulous opening or induration was noted on exam; findings suspicious for anterior anal fissure.  It was recommended that he continue sitz baths daily, add daily fiber, and begin topical therapy with nifedipine/lidocaine ointment. We also recommended a 10-day course of Cipro to cover a possible fistula.  Colorectal surgery referral was a consideration for exam under anesthesia if symptoms persisted.  He was compliant with Remicade 5 mg/kg every 6 weeks.  His bowel habits were stable at 8-10 bowel movements per day.  Frequent bowel movements usually eased off in the morning and recurred in the afternoon.  He reported persistent perianal blood and drainage with pain and discomfort.  Nifedipine cream was somewhat helpful.  He has recently started Preparation H cream and wipes and also reports some improvement using this.  He was not notified of a ready prescription for Cipro at his pharmacy and did not complete the course as prescribed after his 2/8/24 visit.  After the patient's April 9, 2024 visit, labs were ordered and the patient was referred to colorectal surgery.  Labs done on April 24, 2024 showed a sed rate of 17, C-reactive protein of less than 1, a QuantiFERON gold TB test which was negative, white blood cell count of 9100, hemoglobin 15.7, hematocrit 45.4%, platelet count 3 and 78,000, a normal complete metabolic panel, an AST of 27, ALT 33, total bilirubin 0.4 and alkaline phosphatase 73.  The patient was seen by Dr. Mendez of colorectal surgery.  She ultimately performed an examination under anesthesia with anoscopy and biopsy on May 8, 2024.  This showed severe inflammation of the anal canal, with an occluded fistula but no abscess.  The internal os was palpated the anterior midline but the catheter could not be threaded through due to inflammatory change.  The anal canal showed somewhat of a stricture, which bled without any manipulation.  There is a biopsy taken of the anal margin which was negative.  The patient subsequently was seen in follow-up.  It was felt that a different method of treatment might be necessary. Dr. Mendez did discuss the possibility of diverting colostomy, but he wished to avoid this if at all possible.  The patient was given a Canasa suppository which seemed to help with the bleeding.  At his 5/28/24 OV, he was having less frequent bowel movements, averaging about 6/day.  He recently had 2 large blood clots which he passed per rectum but otherwise his bleeding had decreased.  He denied any fever, chills, sweats.  He still had fecal urgency.  He did not have any abdominal pain.  He described some recent some difficulty with kidney stones, and did complain of chronic fatigue and joint pain.  He denied any chava joint inflammation, however.  The patient was continued on Remicade 5 mg/kg every 6 weeks.  He had antibody levels checked as well as a trough Remicade level which was just drawn this past week.  I do not have the results of that lab work.  He had labs done on June 13, 2024 showing a white blood cell count of 7800, hemoglobin 14.5, medic at 41.5%, platelet count of  417,000,  sodium 144, potassium 3.4, chloride 103, bicarb 24, BUN 7, creatinine 1.52, glucose 95, albumin 4.3, total bili 0.3, AST 25, ALT 25, and alkaline phosphatase 24.  There was a discussion about the possibility of starting mercaptopurine but this was never initiated as we were waiting on the results of his antibody testing.  There was some question of the results of the antibody testing taken last week, as he was given information showing that the test order was ambiguous.   He is still at baseline. His rectal bleeding is somewhat better.  He denies abdominal pain, fever, chills, sweats, etc.  Of note, his next Remicade infusion is on August 5.

## 2024-07-03 ENCOUNTER — TELEPHONE ENCOUNTER (OUTPATIENT)
Dept: URBAN - METROPOLITAN AREA CLINIC 113 | Facility: CLINIC | Age: 39
End: 2024-07-03

## 2024-07-08 ENCOUNTER — TELEPHONE ENCOUNTER (OUTPATIENT)
Dept: URBAN - METROPOLITAN AREA CLINIC 113 | Facility: CLINIC | Age: 39
End: 2024-07-08

## 2024-07-09 ENCOUNTER — OFFICE VISIT (OUTPATIENT)
Dept: URBAN - METROPOLITAN AREA CLINIC 107 | Facility: CLINIC | Age: 39
End: 2024-07-09

## 2024-07-09 NOTE — HPI-TODAY'S VISIT:
This is a 38-year-old male with a history of fistulizing ileocolonic Crohn's disease diagnosed at age 5 status post 2 small bowel/colonic resections on Remicade 5 mg/kg every 6 weeks presenting for follow-up.  He was last seen in the office 7/2/2024 by Dr. Coon.  He had undergone colonoscopy in September notable for inflammation in the sigmoid, descending, transverse colon graded as mets grade 2.  This appeared improved from prior examinations and surgical anastomosis was healthy-appearing.  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  It was possible that he was a primary anti-TNF nonresponder or that he may have antibodies or have subtherapeutic dosing.  Infliximab levels and antibodies were ordered.  He was empirically started on 6-mercaptopurine 50 mg daily.  He was to have CBC, CMP, and 6-MP metabolites in 2 weeks.  The plan was to titrate the dose accordingly at that time.  If Remicade dosing was subtherapeutic or he had antibody production, the plan was to change to a different biologic or alter his Remicade dose.  He was to continue nifedipine ointment for an anal fissure and continue follow-up with Dr. Mendez for fissures.

## 2024-07-10 ENCOUNTER — LAB OUTSIDE AN ENCOUNTER (OUTPATIENT)
Dept: URBAN - METROPOLITAN AREA CLINIC 113 | Facility: CLINIC | Age: 39
End: 2024-07-10

## 2024-09-25 ENCOUNTER — OFFICE VISIT (OUTPATIENT)
Dept: URBAN - METROPOLITAN AREA CLINIC 113 | Facility: CLINIC | Age: 39
End: 2024-09-25

## 2024-09-25 RX ORDER — OXYCODONE HYDROCHLORIDE 5 MG/1
1 CAPSULE AS NEEDED CAPSULE ORAL
Refills: 0 | Status: ACTIVE | COMMUNITY

## 2024-09-25 RX ORDER — INFLIXIMAB 100 MG/10ML
5MG/KG INJECTION, POWDER, LYOPHILIZED, FOR SOLUTION INTRAVENOUS
Status: ACTIVE | COMMUNITY
Start: 2021-06-10

## 2024-09-25 RX ORDER — ALPRAZOLAM 0.25 MG/1
TAKE 1 TABLET BY MOUTH TWICE A DAY AS NEEDED FOR ANXIETY TABLET ORAL
Qty: 30 | Refills: 0 | Status: ACTIVE | COMMUNITY
Start: 2019-09-20

## 2024-09-25 RX ORDER — TAMSULOSIN HYDROCHLORIDE 0.4 MG/1
1 CAPSULE CAPSULE ORAL ONCE A DAY
Status: ACTIVE | COMMUNITY

## 2024-09-25 RX ORDER — MERCAPTOPURINE 50 MG/1
ONE TABLET TABLET ORAL DAILY
Qty: 30 | Refills: 5 | Status: ACTIVE | COMMUNITY
Start: 2024-07-03

## 2024-09-25 RX ORDER — PREDNISONE 10 MG/1
TAKE 3 TABLETS BY MOUTH EVERY DAY TABLET ORAL
Qty: 90 | Refills: 0 | Status: ACTIVE | COMMUNITY

## 2024-09-25 NOTE — HPI-TODAY'S VISIT:
This is a 39-year-old male with a history of fistulizing ileocolonic Crohn's disease diagnosed at age 5 status post 2 small bowel/colonic resections on Remicade 5 mg/kg every 6 weeks presenting for follow-up.  He was last seen here on 7/2/24.  He was seen in the office 7/2/2024 after undergoing colonoscopy in September 2023 notable for inflammation in the sigmoid, descending, transverse colon graded as mets grade 2.  This appeared improved from prior examinations and surgical anastomosis was healthy-appearing.  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  It was possible that he was a primary anti-TNF nonresponder or that he may have antibodies or have subtherapeutic dosing.  Infliximab levels and antibodies were ordered.  He was empirically started on 6-mercaptopurine 50 mg daily.  He was to have CBC, CMP, and 6-MP metabolites in 2 weeks.  The plan was to titrate the dose accordingly at that time.  If Remicade dosing was subtherapeutic or he had antibody production, the plan was to change to a different biologic or alter his Remicade dose.  He was to continue nifedipine ointment for an anal fissure and continue follow-up with Dr. Mendez for fissures.

## 2024-10-15 ENCOUNTER — TELEPHONE ENCOUNTER (OUTPATIENT)
Dept: URBAN - METROPOLITAN AREA CLINIC 113 | Facility: CLINIC | Age: 39
End: 2024-10-15

## 2024-11-21 ENCOUNTER — OFFICE VISIT (OUTPATIENT)
Dept: URBAN - METROPOLITAN AREA CLINIC 107 | Facility: CLINIC | Age: 39
End: 2024-11-21
Payer: COMMERCIAL

## 2024-11-21 ENCOUNTER — TELEPHONE ENCOUNTER (OUTPATIENT)
Dept: URBAN - METROPOLITAN AREA CLINIC 113 | Facility: CLINIC | Age: 39
End: 2024-11-21

## 2024-11-21 VITALS
TEMPERATURE: 97.9 F | WEIGHT: 195.8 LBS | SYSTOLIC BLOOD PRESSURE: 139 MMHG | HEIGHT: 69 IN | DIASTOLIC BLOOD PRESSURE: 77 MMHG | BODY MASS INDEX: 29 KG/M2 | HEART RATE: 58 BPM

## 2024-11-21 DIAGNOSIS — R13.19 ESOPHAGEAL DYSPHAGIA: ICD-10-CM

## 2024-11-21 DIAGNOSIS — Z79.899 HIGH RISK MEDICATION USE: ICD-10-CM

## 2024-11-21 DIAGNOSIS — K50.80 CROHN'S DISEASE OF BOTH SMALL AND LARGE INTESTINE WITHOUT COMPLICATION: ICD-10-CM

## 2024-11-21 DIAGNOSIS — K21.9 CHRONIC GERD: ICD-10-CM

## 2024-11-21 PROBLEM — 235595009: Status: ACTIVE | Noted: 2024-11-21

## 2024-11-21 PROCEDURE — 99214 OFFICE O/P EST MOD 30 MIN: CPT | Performed by: INTERNAL MEDICINE

## 2024-11-21 RX ORDER — PANTOPRAZOLE SODIUM 40 MG/1
1 TABLET TABLET, DELAYED RELEASE ORAL ONCE A DAY
Qty: 90 TABLET | Refills: 3 | OUTPATIENT
Start: 2024-11-21

## 2024-11-21 RX ORDER — ALPRAZOLAM 0.25 MG/1
TAKE 1 TABLET BY MOUTH TWICE A DAY AS NEEDED FOR ANXIETY TABLET ORAL
Qty: 30 | Refills: 0 | Status: ACTIVE | COMMUNITY
Start: 2019-09-20

## 2024-11-21 RX ORDER — PREDNISONE 10 MG/1
TAKE 3 TABLETS BY MOUTH EVERY DAY TABLET ORAL
Qty: 90 | Refills: 0 | Status: ACTIVE | COMMUNITY

## 2024-11-21 RX ORDER — INFLIXIMAB 100 MG/10ML
5MG/KG INJECTION, POWDER, LYOPHILIZED, FOR SOLUTION INTRAVENOUS
Status: ACTIVE | COMMUNITY
Start: 2021-06-10

## 2024-11-21 RX ORDER — OXYCODONE HYDROCHLORIDE 5 MG/1
1 CAPSULE AS NEEDED CAPSULE ORAL
Refills: 0 | Status: ACTIVE | COMMUNITY

## 2024-11-21 RX ORDER — MERCAPTOPURINE 50 MG/1
ONE TABLET TABLET ORAL DAILY
Qty: 30 | Refills: 5 | Status: ACTIVE | COMMUNITY
Start: 2024-07-03

## 2024-11-21 RX ORDER — TAMSULOSIN HYDROCHLORIDE 0.4 MG/1
1 CAPSULE CAPSULE ORAL ONCE A DAY
Status: ACTIVE | COMMUNITY

## 2024-11-21 NOTE — HPI-TODAY'S VISIT:
This is a 39-year-old male with a history of fistulizing ileocolonic Crohn's disease diagnosed at age 5 status post 2 small bowel/colonic resections on Remicade 5 mg/kg every 6 weeks presenting for follow-up.  He was last seen here on 7/2/24.    The patient subsequently missed follow-up visits on 7/9/2024 and 9/25/2024.  He was seen in the office 7/2/2024 after undergoing colonoscopy in September 2023 notable for inflammation in the sigmoid, descending, transverse colon graded as mets grade 2.  This appeared improved from prior examinations and surgical anastomosis was healthy-appearing.  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  It was possible that he was a primary anti-TNF nonresponder or that he may have antibodies or have subtherapeutic dosing.  Infliximab levels and antibodies were ordered.  He was empirically started on 6-mercaptopurine 50 mg daily.  He was to have CBC, CMP, and 6-MP metabolites in 2 weeks.  The plan was to titrate the dose accordingly at that time.  If Remicade dosing was subtherapeutic or he had antibody production, the plan was to change to a different biologic or alter his Remicade dose.  He was to continue nifedipine ointment for an anal fissure and continue follow-up with Dr. Mendez for fissures.    Apparently, the patient became concerned about starting 6-MP and never began this medication because of concerns about immunosuppressives.     He had trough infliximab levels drawn on October 15, 2024, and this revealed a normal level at 19.  Infliximab antibody levels were undetectable.    The patient is still having drainage and bright red rectal bleeding.  He is having up to 20 bowel movements per day on average, which are loose but not liquid.  He denies abdominal pain.  He does feel that his energy is low.  He has been using prednisone on an as needed basis.  He has been reluctant to change to a different drug regimen, and has been unwilling to add 6-mercaptopurine or other immunosuppressives due to risksof venous depression.  The patient has had some recent dysphagia to solids.  He denies odynophagia.

## 2024-11-22 ENCOUNTER — WEB ENCOUNTER (OUTPATIENT)
Dept: URBAN - METROPOLITAN AREA CLINIC 107 | Facility: CLINIC | Age: 39
End: 2024-11-22

## 2024-11-22 RX ORDER — ALPRAZOLAM 0.25 MG/1
TAKE 1 TABLET BY MOUTH TWICE A DAY FOR ANXIETY TABLET ORAL TWICE A DAY
Refills: 1
Start: 2019-09-20

## 2024-11-22 RX ORDER — PREDNISONE 10 MG/1
TAKE 3 TABLETS BY MOUTH EVERY DAY TABLET ORAL
Qty: 90 | Refills: 0
End: 2024-12-25

## 2024-11-26 ENCOUNTER — TELEPHONE ENCOUNTER (OUTPATIENT)
Dept: URBAN - METROPOLITAN AREA CLINIC 113 | Facility: CLINIC | Age: 39
End: 2024-11-26

## 2024-11-26 RX ORDER — ALPRAZOLAM 0.25 MG/1
TAKE 1 TABLET BY MOUTH TWICE A DAY AS NEEDED FOR ANXIETY TABLET ORAL
Qty: 30 | Refills: 0
Start: 2019-09-20

## 2024-12-02 ENCOUNTER — OFFICE VISIT (OUTPATIENT)
Dept: URBAN - METROPOLITAN AREA TELEHEALTH 2 | Facility: TELEHEALTH | Age: 39
End: 2024-12-02
Payer: COMMERCIAL

## 2024-12-02 ENCOUNTER — TELEPHONE ENCOUNTER (OUTPATIENT)
Dept: URBAN - METROPOLITAN AREA CLINIC 107 | Facility: CLINIC | Age: 39
End: 2024-12-02

## 2024-12-02 DIAGNOSIS — K50.80 CROHN'S DISEASE OF BOTH SMALL AND LARGE INTESTINE WITHOUT COMPLICATION: ICD-10-CM

## 2024-12-02 PROCEDURE — 97802 MEDICAL NUTRITION INDIV IN: CPT | Performed by: DIETITIAN, REGISTERED

## 2024-12-02 RX ORDER — MERCAPTOPURINE 50 MG/1
ONE TABLET TABLET ORAL DAILY
Qty: 30 | Refills: 5 | Status: ACTIVE | COMMUNITY
Start: 2024-07-03

## 2024-12-02 RX ORDER — OXYCODONE HYDROCHLORIDE 5 MG/1
1 CAPSULE AS NEEDED CAPSULE ORAL
Refills: 0 | Status: ACTIVE | COMMUNITY

## 2024-12-02 RX ORDER — PANTOPRAZOLE SODIUM 40 MG/1
1 TABLET TABLET, DELAYED RELEASE ORAL ONCE A DAY
Qty: 90 TABLET | Refills: 3 | Status: ACTIVE | COMMUNITY
Start: 2024-11-21

## 2024-12-02 RX ORDER — ALPRAZOLAM 0.25 MG/1
TAKE 1 TABLET BY MOUTH TWICE A DAY AS NEEDED FOR ANXIETY TABLET ORAL
Qty: 30 | Refills: 0 | Status: ACTIVE | COMMUNITY
Start: 2019-09-20

## 2024-12-02 RX ORDER — PREDNISONE 10 MG/1
TAKE 3 TABLETS BY MOUTH EVERY DAY TABLET ORAL
Qty: 90 | Refills: 0 | Status: ACTIVE | COMMUNITY
End: 2024-12-25

## 2024-12-02 RX ORDER — INFLIXIMAB 100 MG/10ML
5MG/KG INJECTION, POWDER, LYOPHILIZED, FOR SOLUTION INTRAVENOUS
Status: ACTIVE | COMMUNITY
Start: 2021-06-10

## 2024-12-02 RX ORDER — TAMSULOSIN HYDROCHLORIDE 0.4 MG/1
1 CAPSULE CAPSULE ORAL ONCE A DAY
Status: ACTIVE | COMMUNITY

## 2024-12-04 ENCOUNTER — TELEPHONE ENCOUNTER (OUTPATIENT)
Dept: URBAN - METROPOLITAN AREA CLINIC 107 | Facility: CLINIC | Age: 39
End: 2024-12-04

## 2024-12-12 ENCOUNTER — TELEPHONE ENCOUNTER (OUTPATIENT)
Dept: URBAN - METROPOLITAN AREA CLINIC 107 | Facility: CLINIC | Age: 39
End: 2024-12-12

## 2024-12-12 ENCOUNTER — TELEPHONE ENCOUNTER (OUTPATIENT)
Dept: URBAN - METROPOLITAN AREA CLINIC 113 | Facility: CLINIC | Age: 39
End: 2024-12-12

## 2025-01-30 ENCOUNTER — TELEPHONE ENCOUNTER (OUTPATIENT)
Dept: URBAN - METROPOLITAN AREA CLINIC 112 | Facility: CLINIC | Age: 40
End: 2025-01-30

## 2025-04-17 ENCOUNTER — OFFICE VISIT (OUTPATIENT)
Dept: URBAN - METROPOLITAN AREA CLINIC 107 | Facility: CLINIC | Age: 40
End: 2025-04-17
Payer: COMMERCIAL

## 2025-04-17 DIAGNOSIS — K50.80 CROHN'S DISEASE OF BOTH SMALL AND LARGE INTESTINE WITHOUT COMPLICATION: ICD-10-CM

## 2025-04-17 PROCEDURE — 99213 OFFICE O/P EST LOW 20 MIN: CPT | Performed by: INTERNAL MEDICINE

## 2025-04-17 RX ORDER — CIPROFLOXACIN 500 MG/1
1 TABLET TABLET, FILM COATED ORAL
Qty: 28 TABLET | Refills: 3 | OUTPATIENT
Start: 2025-04-17 | End: 2025-06-12

## 2025-04-17 RX ORDER — PANTOPRAZOLE SODIUM 40 MG/1
1 TABLET TABLET, DELAYED RELEASE ORAL ONCE A DAY
Qty: 90 TABLET | Refills: 3 | Status: ACTIVE | COMMUNITY
Start: 2024-11-21

## 2025-04-17 RX ORDER — LACTOBACIL 2/BIFIDO 1/S.THERMO 900B CELL
ONE CAPSULE PACKET (EA) ORAL TWICE A DAY
Qty: 270 | Refills: 5 | OUTPATIENT
Start: 2025-04-17 | End: 2025-10-14

## 2025-04-17 RX ORDER — OXYCODONE HYDROCHLORIDE 5 MG/1
1 CAPSULE AS NEEDED CAPSULE ORAL
Refills: 0 | Status: ACTIVE | COMMUNITY

## 2025-04-17 RX ORDER — TAMSULOSIN HYDROCHLORIDE 0.4 MG/1
1 CAPSULE CAPSULE ORAL ONCE A DAY
Status: ACTIVE | COMMUNITY

## 2025-04-17 RX ORDER — ALPRAZOLAM 0.25 MG/1
TAKE 1 TABLET BY MOUTH TWICE A DAY AS NEEDED FOR ANXIETY TABLET ORAL
Qty: 30 | Refills: 0 | Status: ACTIVE | COMMUNITY
Start: 2019-09-20

## 2025-04-17 RX ORDER — MERCAPTOPURINE 50 MG/1
ONE TABLET TABLET ORAL DAILY
Qty: 30 | Refills: 5 | Status: ON HOLD | COMMUNITY
Start: 2024-07-03

## 2025-04-17 RX ORDER — INFLIXIMAB 100 MG/10ML
5MG/KG INJECTION, POWDER, LYOPHILIZED, FOR SOLUTION INTRAVENOUS
Status: ACTIVE | COMMUNITY
Start: 2021-06-10

## 2025-04-17 NOTE — HPI-TODAY'S VISIT:
This is a 39-year-old male with a history of fistulizing ileocolonic Crohn's disease diagnosed at age 5 status post 2 small bowel/colonic resections on Remicade 5 mg/kg every 6 weeks presenting for follow-up.  He was seen here on 7/2/24, but missed follow-up visits on 7/9/2024 and 9/25/2024 before returning on 11/21/24 for his last OV.  He was seen in the office 7/2/2024 after undergoing colonoscopy in September 2023 notable for inflammation in the sigmoid, descending, transverse colon graded as grade 2.  This appeared improved from prior examinations and surgical anastomosis was healthy-appearing.  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  It was possible that he was a primary anti-TNF nonresponder or that he may have antibodies or have subtherapeutic dosing.  Infliximab levels and antibodies were ordered.  He was empirically started on 6-mercaptopurine 50 mg daily.  He was to have CBC, CMP, and 6-MP metabolites in 2 weeks.  The plan was to titrate the dose accordingly at that time.  If Remicade dosing was subtherapeutic or he had antibody production, the plan was to change to a different biologic or alter his Remicade dose.  He was to continue nifedipine ointment for an anal fissure and continue follow-up with Dr. Mendez for fissures.    Apparently, the patient became concerned about starting 6-MP and never began this medication because of concerns about immunosuppressives.     He had trough infliximab levels drawn on October 15, 2024, and this revealed a normal level at 19.  Infliximab antibody levels were undetectable.  At his 11/21/24 OV, the patient was still having rectal drainage and bright red rectal bleeding.  He was having up to 20 bowel movements per day on average, which were loose but not liquid.  He denied abdominal pain. His energy level was low.  He had been using prednisone on an as needed basis.  He has been reluctant to change to a different drug regimen, and has been unwilling to add 6-mercaptopurine due to risks of immunosuppression. We elected to change his Remicade dosing at 10 mg/kg every 8 weeks.  This was initially denied, but ultimately he did receive the higher dose on January 30, 2025.We also checked labs at that time.  These were not drawn until February 6, 2025, and were notable for a white blood cell count of 9600, hemoglobin 15.2, hematocrit 44.1, platelet count of 4 and 37,000, B12 level of 245, vitamin D level which is low at 22.3, sed rate of 13, sodium 144, potassium 3.1, chloride 102, bicarb 27, BUN 8, creatinine 1.32, glucose 91, albumin 4.3, AST 19, ALT 20, alkaline phosphatase 74 and total bilirubin 0.3.  I do not see a C-reactive protein level.  The patient also described some recent dysphagia to solids at his 11/21/24 OV.  He denied odynophagia.  We offered him both upper endoscopy and a barium swallow, but he elected instead to try empiric therapy with pantoprazole first, reserving diagnostic evaluation for nonresponse.  He returns today for follow-up.  He is still having problems with rectal bleeding and drainage, presumably from the fistula.  He denies abdominal pain.  He has had no fever.  He did receive both potassium and vitamin D supplements because of the above-mentioned lab abnormalities.  His symptoms tend to increase further 2 weeks before infusion, and decrease after Remicade.  He is having up to 10 bowel movements per day.  The patient's reflux symptoms are currently controlled on pantoprazole 40 mg daily.  He is not having dysphagia at present.

## 2025-07-09 ENCOUNTER — OFFICE VISIT (OUTPATIENT)
Dept: URBAN - METROPOLITAN AREA CLINIC 107 | Facility: CLINIC | Age: 40
End: 2025-07-09